# Patient Record
Sex: FEMALE | Race: WHITE | NOT HISPANIC OR LATINO | URBAN - METROPOLITAN AREA
[De-identification: names, ages, dates, MRNs, and addresses within clinical notes are randomized per-mention and may not be internally consistent; named-entity substitution may affect disease eponyms.]

---

## 2017-08-29 ENCOUNTER — OUTPATIENT (OUTPATIENT)
Dept: OUTPATIENT SERVICES | Facility: HOSPITAL | Age: 47
LOS: 1 days | Discharge: HOME | End: 2017-08-29

## 2017-08-29 DIAGNOSIS — R92.2 INCONCLUSIVE MAMMOGRAM: ICD-10-CM

## 2017-12-04 ENCOUNTER — OUTPATIENT (OUTPATIENT)
Dept: OUTPATIENT SERVICES | Facility: HOSPITAL | Age: 47
LOS: 1 days | Discharge: HOME | End: 2017-12-04

## 2017-12-04 DIAGNOSIS — Z12.31 ENCOUNTER FOR SCREENING MAMMOGRAM FOR MALIGNANT NEOPLASM OF BREAST: ICD-10-CM

## 2018-01-09 ENCOUNTER — APPOINTMENT (OUTPATIENT)
Dept: OBGYN | Facility: CLINIC | Age: 48
End: 2018-01-09
Payer: COMMERCIAL

## 2018-01-09 PROBLEM — Z00.00 ENCOUNTER FOR PREVENTIVE HEALTH EXAMINATION: Status: ACTIVE | Noted: 2018-01-09

## 2018-01-09 PROCEDURE — 99214 OFFICE O/P EST MOD 30 MIN: CPT

## 2018-12-17 ENCOUNTER — OUTPATIENT (OUTPATIENT)
Dept: OUTPATIENT SERVICES | Facility: HOSPITAL | Age: 48
LOS: 1 days | Discharge: HOME | End: 2018-12-17

## 2018-12-17 DIAGNOSIS — Z12.31 ENCOUNTER FOR SCREENING MAMMOGRAM FOR MALIGNANT NEOPLASM OF BREAST: ICD-10-CM

## 2018-12-17 DIAGNOSIS — R92.2 INCONCLUSIVE MAMMOGRAM: ICD-10-CM

## 2019-03-11 ENCOUNTER — APPOINTMENT (OUTPATIENT)
Dept: BREAST CENTER | Facility: CLINIC | Age: 49
End: 2019-03-11
Payer: COMMERCIAL

## 2019-03-11 VITALS
DIASTOLIC BLOOD PRESSURE: 80 MMHG | HEIGHT: 67 IN | WEIGHT: 150 LBS | BODY MASS INDEX: 23.54 KG/M2 | SYSTOLIC BLOOD PRESSURE: 122 MMHG | TEMPERATURE: 98.1 F

## 2019-03-11 DIAGNOSIS — Z87.19 PERSONAL HISTORY OF OTHER DISEASES OF THE DIGESTIVE SYSTEM: ICD-10-CM

## 2019-03-11 DIAGNOSIS — N64.4 MASTODYNIA: ICD-10-CM

## 2019-03-11 DIAGNOSIS — Z80.0 FAMILY HISTORY OF MALIGNANT NEOPLASM OF DIGESTIVE ORGANS: ICD-10-CM

## 2019-03-11 PROCEDURE — 99203 OFFICE O/P NEW LOW 30 MIN: CPT

## 2019-03-22 NOTE — ASSESSMENT
[FreeTextEntry1] : Barbie is a 48 perimenopausal F with bilateral breast pain. \par \par On exam, there was no evidence of disease, however she did have dense breast tissue in bilateral UOQ.  Her most recent imaging was a b/l screening mammogram and US on 12/17/18 which revealed post op fibrosis in her right breast, but was otherwise unrevealing for any suspicious abnormalities.  She will be due for her next b/l mammogram and US On 12/17/19.  This will be scheduled for her today. \par \par We discussed dense breasts.  Increasing breast density has been found to increase ones risk of breast cancer, but at this time, there is no clear indication for additional imaging in this setting, as both US and MRI have not been found to improve survival.  One can consider bilateral screening US.  However, out of 1000 women screened, the use of routine US will only identify an additional 3-5 cancers.  The use of US was found to increase the likelihood of undergoing more imaging and more biopsies.  She does have dense breasts.  We have decided to proceed with screening bilateral breast US at this time.  This will be scheduled with her next screening mammogram.\par \par In regards to her breast pain, it may be related to fibrocystic changes within her breast that are hormonally influenced. We spoke about possible interventions including evening primrose oil, supportive bras, and decreasing caffeine intake.  Although none of these have been consistently proven to improve breast pain, they may be tried.  If the pain becomes very severe, there have been studies of tamoxifen being effective for the treatment of breast pain, although there are risks with tamoxifen.  At this time she will try supportive measures as she is not interested in taking any medications at this time.\par \par She is otherwise at an average risk for breast cancer and should continue with her yearly screening mammograms. \par \par All of her questions were answered.  She knows to call with any further questions or concerns. \par \par PLAN: \par -b/l mammogram and US On 12/17/19\par -f/up after

## 2019-03-22 NOTE — CONSULT LETTER
[Dear  ___] : Dear  [unfilled], [Consult Letter:] : I had the pleasure of evaluating your patient, [unfilled]. [Please see my note below.] : Please see my note below. [Consult Closing:] : Thank you very much for allowing me to participate in the care of this patient.  If you have any questions, please do not hesitate to contact me. [Sincerely,] : Sincerely, [FreeTextEntry2] : Meliton Badillo MD\par [address] [place] [address]\[place] [address] [FreeTextEntry3] : Hortencia Molina MD \par Breast Surgical Oncologist\par [facility] [place]\[facility]\par [facility] [place]\par

## 2019-03-22 NOTE — PAST MEDICAL HISTORY
[Perimenopausal] : The patient is perimenopausal [Menarche Age ____] : age at menarche was [unfilled] [Definite ___ (Date)] : the last menstrual period was [unfilled] [Less Bleeding] : the period was lighter than normal [Irregular Cycle Intervals] : are  irregular [Total Preg ___] : G[unfilled] [Live Births ___] : P[unfilled]  [Age At Live Birth ___] : Age at live birth: [unfilled] [History of Hormone Replacement Treatment] : has no history of hormone replacement treatment [FreeTextEntry5] : denies  [FreeTextEntry6] : denies [FreeTextEntry7] : yes in past x 3-5 years, stopped at age 27  [FreeTextEntry8] : denies

## 2019-03-22 NOTE — DATA REVIEWED
[FreeTextEntry1] : EXAM: US BREAST COMPLETE BI \par \par \par PROCEDURE DATE: 12/17/2018 \par \par \par \par INTERPRETATION: COMPARISON STUDIES: \par The present examination has been compared to prior imaging studies performed \par at [facility] on 10/07/2015, 08/29/2017 and \par 12/17/2018. \par \par ULTRASOUND FINDINGS: \par Bilateral whole breast ultrasound was performed. There is no evidence of any \par solid mass or abnormal cystic elements. \par \par IMPRESSION: \par There is no evidence of malignancy. \par \par RECOMMENDATION: \par Unless otherwise indicated by clinical findings, annual screening \par mammography recommended. \par \par ASSESSMENT: \par BI-RADS Category 1: Negative \par \par \par \par \par \par \par \par \par MAY BERNAL M.D., ATTENDING RADIOLOGIST \par This document has been electronically signed. Dec 17 2018 1:02PM \par \par \par EXAM: MG MAMMO SCREEN W RODRICK BI# \par \par \par PROCEDURE DATE: 12/17/2018 \par \par \par \par INTERPRETATION: HISTORY: \par Bilateral MG MAMMO SCREEN W RODRICK BI#, Bilateral US BREAST COMPLETE BI was \par performed. Patient is 48 years old and is seen for screening. The patient \par has no personal history of cancer. The patient has a history of right \par excisional biopsy at age 41 - benign. The patient has no family history of \par breast cancer. \par \par RISK ASSESSMENT: \par NCI Lifetime Risk: 15.0 \par Tyrer-Cuzick Lifetime Risk: 14.3 \par \par CLINICAL BREAST EXAM: \par The patient reports her last clinical breast exam was performed . \par \par COMPARISON STUDIES: \par The present examination has been compared to prior imaging studies performed \par at [facility] on 06/13/2014, 10/07/2015, 10/20/2016, \par 08/29/2017 and 12/04/2017. \par \par MAMMOGRAM FINDINGS: \par Mammography was performed including the following views: bilateral \par craniocaudal with tomosynthesis and bilateral mediolateral oblique with \par tomosynthesis. The examination includes digital synthetic 2D and digital \par tomosynthesis 3D images. Additional imaging analysis was performed using CAD \par (computer-aided detection) software. \par \par The breasts are heterogeneously dense, which may obscure small masses. \par \par There is an area of benign architectural distortion corresponding to the \par site of surgery and consistent with post operative fibrosis seen in the \par right breast. \par \par No suspicious mass, grouping of calcifications, or other abnormality is \par identified. \par \par IMPRESSION: \par There is no mammographic evidence of malignancy. \par \par RECOMMENDATION: \par Unless otherwise indicated by clinical findings, annual screening \par mammography recommended. \par \par ASSESSMENT: \par BI-RADS Category 2: Benign \par \par The patient will be notified of these results by telephone, and will also be \par mailed a written summary in layman's terms. \par \par \par \par \par \par \par \par \par MAY BERNAL M.D., ATTENDING RADIOLOGIST \par This document has been electronically signed. Dec 17 2018 1:01PM

## 2019-03-22 NOTE — PHYSICAL EXAM
[Normocephalic] : normocephalic [Atraumatic] : atraumatic [EOMI] : extra ocular movement intact [No Supraclavicular Adenopathy] : no supraclavicular adenopathy [No Cervical Adenopathy] : no cervical adenopathy [No dominant masses] : no dominant masses in right breast  [No dominant masses] : no dominant masses left breast [No Nipple Retraction] : no left nipple retraction [No Nipple Discharge] : no left nipple discharge [Examined in the supine and seated position] : examined in the supine and seated position [No Axillary Lymphadenopathy] : no left axillary lymphadenopathy [Soft] : abdomen soft [Not Tender] : non-tender [No Edema] : no edema [No Rashes] : no rashes [No Ulceration] : no ulceration [de-identified] : dense breast tissue in bilateral UOQ, but no discrete masses in either breast

## 2019-03-22 NOTE — HISTORY OF PRESENT ILLNESS
[FreeTextEntry1] : Barbie is a 48 F who presents with b/l breast pain. \par \par She has experienced b/l breast pain that has started worsening recently, especially in the left lateral breast.  She denies palpating any abnormal masses, denies any nipple discharge or retraction, and has never had any prior breast biopsies, but did undergo a R lumpectomy 9 years prior for a benign tumor. \par \par She is also getting irregular menses but denies any hot flashes.  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 \par \par HISTORICAL RISK FACTORS: \par -R lumpectomy 9 years prior -- benign per patient \par -no family history of breast or ovarian cancer \par -, age at first live birth was 32 \par -prior OCP use x 3-5 years, stopped at age 27; has a copper IUD for the past several years which was just recently removed \par \par

## 2019-03-22 NOTE — REVIEW OF SYSTEMS
[Abn Vaginal Bleeding] : unexplained vaginal bleeding [As Noted in HPI] : as noted in HPI [Breast Pain] : breast pain [Negative] : Heme/Lymph [Fever] : no fever [Chills] : no chills [Skin Lesions] : no skin lesions [Skin Wound] : no skin wound [Breast Lump] : no breast lump [Hot Flashes] : no hot flashes

## 2019-09-18 ENCOUNTER — APPOINTMENT (OUTPATIENT)
Dept: CARDIOLOGY | Facility: CLINIC | Age: 49
End: 2019-09-18
Payer: COMMERCIAL

## 2019-09-18 PROCEDURE — 93306 TTE W/DOPPLER COMPLETE: CPT

## 2019-09-19 ENCOUNTER — APPOINTMENT (OUTPATIENT)
Dept: CARDIOLOGY | Facility: CLINIC | Age: 49
End: 2019-09-19
Payer: COMMERCIAL

## 2019-09-19 PROCEDURE — 93015 CV STRESS TEST SUPVJ I&R: CPT

## 2019-10-09 ENCOUNTER — TRANSCRIPTION ENCOUNTER (OUTPATIENT)
Age: 49
End: 2019-10-09

## 2019-12-17 ENCOUNTER — FORM ENCOUNTER (OUTPATIENT)
Age: 49
End: 2019-12-17

## 2019-12-18 ENCOUNTER — OUTPATIENT (OUTPATIENT)
Dept: OUTPATIENT SERVICES | Facility: HOSPITAL | Age: 49
LOS: 1 days | Discharge: HOME | End: 2019-12-18
Payer: COMMERCIAL

## 2019-12-18 DIAGNOSIS — Z12.31 ENCOUNTER FOR SCREENING MAMMOGRAM FOR MALIGNANT NEOPLASM OF BREAST: ICD-10-CM

## 2019-12-18 DIAGNOSIS — R92.2 INCONCLUSIVE MAMMOGRAM: ICD-10-CM

## 2019-12-18 PROCEDURE — 76641 ULTRASOUND BREAST COMPLETE: CPT | Mod: 26,50

## 2019-12-18 PROCEDURE — 77063 BREAST TOMOSYNTHESIS BI: CPT | Mod: 26

## 2019-12-18 PROCEDURE — 77067 SCR MAMMO BI INCL CAD: CPT | Mod: 26

## 2020-01-23 ENCOUNTER — FORM ENCOUNTER (OUTPATIENT)
Age: 50
End: 2020-01-23

## 2020-01-24 ENCOUNTER — OUTPATIENT (OUTPATIENT)
Dept: OUTPATIENT SERVICES | Facility: HOSPITAL | Age: 50
LOS: 1 days | Discharge: HOME | End: 2020-01-24
Payer: COMMERCIAL

## 2020-01-24 DIAGNOSIS — N64.4 MASTODYNIA: ICD-10-CM

## 2020-01-24 PROCEDURE — 76642 ULTRASOUND BREAST LIMITED: CPT | Mod: 26,RT

## 2020-01-24 PROCEDURE — G0279: CPT | Mod: 26,RT

## 2020-01-24 PROCEDURE — 77065 DX MAMMO INCL CAD UNI: CPT | Mod: 26,RT

## 2020-02-26 ENCOUNTER — APPOINTMENT (OUTPATIENT)
Dept: BREAST CENTER | Facility: CLINIC | Age: 50
End: 2020-02-26

## 2021-01-13 ENCOUNTER — OUTPATIENT (OUTPATIENT)
Dept: OUTPATIENT SERVICES | Facility: HOSPITAL | Age: 51
LOS: 1 days | Discharge: HOME | End: 2021-01-13
Payer: COMMERCIAL

## 2021-01-13 DIAGNOSIS — Z12.31 ENCOUNTER FOR SCREENING MAMMOGRAM FOR MALIGNANT NEOPLASM OF BREAST: ICD-10-CM

## 2021-01-13 DIAGNOSIS — R92.2 INCONCLUSIVE MAMMOGRAM: ICD-10-CM

## 2021-01-13 PROCEDURE — 77067 SCR MAMMO BI INCL CAD: CPT | Mod: 26

## 2021-01-13 PROCEDURE — 77063 BREAST TOMOSYNTHESIS BI: CPT | Mod: 26

## 2021-01-13 PROCEDURE — 76641 ULTRASOUND BREAST COMPLETE: CPT | Mod: 26,50

## 2024-08-13 ENCOUNTER — APPOINTMENT (OUTPATIENT)
Dept: ORTHOPEDIC SURGERY | Facility: CLINIC | Age: 54
End: 2024-08-13
Payer: COMMERCIAL

## 2024-08-13 DIAGNOSIS — G56.03 CARPAL TUNNEL SYNDROM,BILATERAL UPPER LIMBS: ICD-10-CM

## 2024-08-13 DIAGNOSIS — M79.641 PAIN IN RIGHT HAND: ICD-10-CM

## 2024-08-13 PROCEDURE — 99204 OFFICE O/P NEW MOD 45 MIN: CPT

## 2024-08-13 NOTE — ASSESSMENT
[FreeTextEntry1] : Patient comes in with complaints of bilateral hand pain.  She says she has numbness from the knuckles down.  This occurs in the morning time.  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  She says that she is not exactly sure when she got it however she went to her medical doctor who got lab work who stated it indicated she had it.  She says in June her finger started getting swollen and then starting July she had numbness in all of her fingers.  She does not have numbness at all times.  In addition she says there is a small area in the right palm that causes her pain when she touches it.  She says she went to podiatrist who scraped off the area and it felt better afterwards.  She is now here for evaluation.  She does not have other complaints today.  B/L hand:  Tender volar wrist  Good finger ROM  +Tinels  +Phalens  +Compression test  Normal sensation median nerve distribution Right palm with a small area just proximal to the ring finger that has a pinpoint area of darkness, tender palpation  The patient was advised of the diagnosis.  The natural history of the pathology was explained in full to the patient in layman's terms. We discussed the nature of the nerve as an electrical cable and what happens to the nerve in carpal tunnel syndrome.  We discussed that treatment for night symptoms included night bracing.  We discussed the possibility of injection when symptoms were intermittent or in patients who were unwilling to undergo surgery with constant symptoms.  We discussed that injection is a diagnostic and therapeutic aide and what this means.  We discussed the use of nerve testing in cases when diagnosis was in doubt or for confirmation to exclude alternate pathology.  We discussed that if symptoms were 24/7 surgery was recommended to give the nerve the best chance to recover but that once symptoms were constant, the nerve may not recover even with surgery.  We discussed that if left alone the nerve progression could worsen and that treatment was indicated to prevent progression of nerve compression.  The longer the nerve is left, the more likely to cause worsening irreversible damage.  All questions were answered.  The risks and benefits of surgical and non-surgical treatment alternatives were explained in full to the patient. I believe the patient has carpal tunnel left with the numbness is in the morning time.  I gave her bilateral cock-up wrist once to wear to sleep at night.  As for the hand swelling may be secondary to some carpal tunnel.  It may be secondary to the fifth disease and it has not gone away and it may be secondary to the beginning of some arthritis.  I do not believe she has any evidence at this point of anything significant that I can treat regarding arthritis however recommending taking anti-inflammatories as needed and using the braces. She will follow-up back in about 1 month for reevaluation to see if the braces are helping her. As for the area on the palm I took an 18-gauge needle and scraped the area that was causing her pain.  Once that was done the patient did not have any more pain.  She will soak the area and squeeze it as well.  Should it come back she will see a dermatologist.

## 2024-08-29 ENCOUNTER — APPOINTMENT (OUTPATIENT)
Dept: ORTHOPEDIC SURGERY | Facility: CLINIC | Age: 54
End: 2024-08-29

## 2024-09-17 ENCOUNTER — APPOINTMENT (OUTPATIENT)
Dept: ORTHOPEDIC SURGERY | Facility: CLINIC | Age: 54
End: 2024-09-17

## 2024-09-26 ENCOUNTER — APPOINTMENT (OUTPATIENT)
Dept: CARDIOLOGY | Facility: CLINIC | Age: 54
End: 2024-09-26
Payer: COMMERCIAL

## 2024-09-26 VITALS
WEIGHT: 150 LBS | OXYGEN SATURATION: 100 % | HEART RATE: 62 BPM | SYSTOLIC BLOOD PRESSURE: 110 MMHG | DIASTOLIC BLOOD PRESSURE: 74 MMHG | BODY MASS INDEX: 23.54 KG/M2 | HEIGHT: 67 IN

## 2024-09-26 DIAGNOSIS — Z13.6 ENCOUNTER FOR SCREENING FOR CARDIOVASCULAR DISORDERS: ICD-10-CM

## 2024-09-26 DIAGNOSIS — Z82.49 FAMILY HISTORY OF ISCHEMIC HEART DISEASE AND OTHER DISEASES OF THE CIRCULATORY SYSTEM: ICD-10-CM

## 2024-09-26 PROCEDURE — 99204 OFFICE O/P NEW MOD 45 MIN: CPT | Mod: 25

## 2024-09-26 PROCEDURE — 93000 ELECTROCARDIOGRAM COMPLETE: CPT

## 2024-09-26 RX ORDER — PANTOPRAZOLE 40 MG/1
40 TABLET, DELAYED RELEASE ORAL
Refills: 0 | Status: ACTIVE | COMMUNITY

## 2024-09-26 NOTE — HISTORY OF PRESENT ILLNESS
[FreeTextEntry1] : Referred by Samra Nieves.  54 F with borderline lipids. CACS 0 in 11/2019.  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  and drinks gin with simple syrup  Cardiac testing in 2019 at [facility] was normal.  No CP, SOB, palpitations, orthopnea/PND, dizziness or syncope.  Gaining weight in perimenopause.

## 2024-09-26 NOTE — HISTORY OF PRESENT ILLNESS
[FreeTextEntry1] : Referred by Samra Nieves.  54 F with borderline lipids. CACS 0 in 11/2019.  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  and drinks gin with simple syrup  Cardiac testing in 2019 at [facility] was normal.  No CP, SOB, palpitations, orthopnea/PND, dizziness or syncope.  Gaining weight in perimenopause.

## 2024-09-26 NOTE — DISCUSSION/SUMMARY
[FreeTextEntry1] : 53 yo asymptomatic low-risk female.  Repeat screening tests in 1-2 years.  Low-carb diet and avoid simple syrup discussed.  Lipid panel with PCP scheduled next month.